# Patient Record
Sex: MALE | Race: WHITE | URBAN - METROPOLITAN AREA
[De-identification: names, ages, dates, MRNs, and addresses within clinical notes are randomized per-mention and may not be internally consistent; named-entity substitution may affect disease eponyms.]

---

## 2018-08-20 ENCOUNTER — APPOINTMENT (OUTPATIENT)
Dept: OCCUPATIONAL MEDICINE | Facility: CLINIC | Age: 33
End: 2018-08-20

## 2018-08-20 PROCEDURE — 99000 SPECIMEN HANDLING OFFICE-LAB: CPT | Performed by: FAMILY MEDICINE

## 2018-11-02 ENCOUNTER — ALLIED HEALTH/NURSE VISIT (OUTPATIENT)
Dept: FAMILY MEDICINE | Facility: CLINIC | Age: 33
End: 2018-11-02

## 2018-11-02 DIAGNOSIS — T30.4 CHEMICAL BURN: Primary | ICD-10-CM

## 2018-11-02 PROCEDURE — 99207 ZZC NO CHARGE NURSE ONLY: CPT
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
## 2018-12-10 ENCOUNTER — TELEPHONE (OUTPATIENT)
Dept: FAMILY MEDICINE | Facility: CLINIC | Age: 33
End: 2018-12-10

## 2018-12-10 ENCOUNTER — OFFICE VISIT (OUTPATIENT)
Dept: FAMILY MEDICINE | Facility: CLINIC | Age: 33
End: 2018-12-10
Payer: OTHER MISCELLANEOUS

## 2018-12-10 ENCOUNTER — ANCILLARY PROCEDURE (OUTPATIENT)
Dept: GENERAL RADIOLOGY | Facility: CLINIC | Age: 33
End: 2018-12-10
Attending: NURSE PRACTITIONER
Payer: OTHER MISCELLANEOUS

## 2018-12-10 VITALS
TEMPERATURE: 98.9 F | WEIGHT: 200 LBS | HEART RATE: 76 BPM | DIASTOLIC BLOOD PRESSURE: 70 MMHG | SYSTOLIC BLOOD PRESSURE: 126 MMHG | RESPIRATION RATE: 18 BRPM

## 2018-12-10 DIAGNOSIS — S96.911A STRAIN OF RIGHT ANKLE, INITIAL ENCOUNTER: Primary | ICD-10-CM

## 2018-12-10 DIAGNOSIS — S86.001A INJURY OF RIGHT ACHILLES TENDON, INITIAL ENCOUNTER: ICD-10-CM

## 2018-12-10 DIAGNOSIS — S96.911A STRAIN OF RIGHT ANKLE, INITIAL ENCOUNTER: ICD-10-CM

## 2018-12-10 PROCEDURE — 73610 X-RAY EXAM OF ANKLE: CPT | Mod: RT

## 2018-12-10 PROCEDURE — 99214 OFFICE O/P EST MOD 30 MIN: CPT | Performed by: NURSE PRACTITIONER

## 2018-12-10 SDOH — HEALTH STABILITY: MENTAL HEALTH: HOW OFTEN DO YOU HAVE A DRINK CONTAINING ALCOHOL?: NEVER

## 2018-12-10 NOTE — PATIENT INSTRUCTIONS
Patient is to contact [facility]  at [phone] to schedule the MRI.        Patient Education      *SPRAIN:ANKLE [w/ x-ray]    A sprain is an injury to the ligaments or capsule that holds a joint together. There are no broken bones. Most sprains take from four to six weeks to heal. If the ligament is completely torn (severe sprain), it can take several months to recover.  Mild to Moderate sprains may be treated with an elastic wrap or an in-shoe splint to provide support and prevent re-injury. A mild sprain may not require any additional support. A severe sprain may require surgery to repair, but this is rare.  HOME CARE:  1. Stay off the injured leg as much as possible until you can walk on it without pain. If you have a lot of pain with walking, crutches or a walker may be prescribed. (These can be rented or purchased at many pharmacies and surgical or orthopedic supply stores). Follow your doctor's advice regarding when to begin bearing weight on that leg.  2. Keep your leg elevated to reduce pain and swelling. When sleeping, place a pillow under the injured leg. When sitting, support the injured leg so it is level with your waist. This is very important during the first 48 hours.  3. Apply an ice pack (ice cubes in a plastic bag, wrapped in a towel) over the injured area for 20 minutes every 1-2 hours the first day. You can place the ice pack directly over the splint/cast. If you were given a boot, open it to apply the ice pack. Continue with ice packs 3-4 times a day for the next two days, then as needed for the relief of pain and swelling.  4. You may use acetaminophen (Tylenol) 650-1000 mg every 6 hours or ibuprofen (Motrin, Advil) 600 mg every 6-8 hours with food to control pain, if you are able to take these medicines. [NOTE: If you have chronic liver or kidney disease or ever had a stomach ulcer or GI bleeding, talk with your doctor before using these medicines.]  5. You may  return to sports after healing, when you can run without pain.  6. A sprained ankle is at risk for re-injury during the first six weeks. During that time, protect your ankle with an in-shoe splint that prevents tilting of your ankle from side to side. This is very important if you do active work or play sports during that time.  FOLLOW UP with your doctor, or as advised, if you are not starting to improve within the next five days.     GET PROMPT MEDICAL ATTENTION if any of the following occur:    The plaster cast or splint gets wet or soft    The fiberglass cast or splint gets wet and does not dry for 24 hours    Pain or swelling increases, or redness appears    Toes become cold, blue, numb or tingly    0912-1811 Located within [facility], [address] [place] [address], [place] [address]. All rights reserved. This information is not intended as a substitute for professional medical care. Always follow your healthcare professional's instructions.

## 2018-12-10 NOTE — NURSING NOTE
Chief Complaint   Patient presents with     Ankle Pain       Initial /70 (BP Location: Right arm, Cuff Size: Adult Large)   Pulse 76   Temp 98.9  F (37.2  C) (Tympanic)   Resp 18   Wt 90.7 kg (200 lb)  There is no height or weight on file to calculate BMI.    Patient presents to the clinic using No DME    Health Maintenance that is potentially due pending provider review:  NONE    Is there anyone who you would like to be able to receive your results? No  If yes have patient fill out DARREL

## 2018-12-10 NOTE — TELEPHONE ENCOUNTER
Pt was into today with KACY Dillard.  Pt was given a letter. Would like it revised to say.  Pt works 2 hrs one way 4 hrs a day.  Provider said he cannot drive that far and keep his Rt Ankle elevated.  Pt letter should say he cannot drive due to how many hrs he is on the road.  [employer] support works.  [email or web address]  [facility]

## 2018-12-10 NOTE — PROGRESS NOTES
SUBJECTIVE:   Pravin Landaverde is a 33 year old male who presents to clinic today for the following health issues:    Joint Pain    Onset: about one week    Description:   Location: right ankle  Character: Dull ache    Intensity: moderate    Progression of Symptoms: slightly improving    Accompanying Signs & Symptoms:  Other symptoms: swelling, bruising, pain radiating from right hip down leg    History:   Previous similar pain: no       Precipitating factors:   Trauma or overuse: YES- injured his ankle stepping out of a truck at work    Alleviating factors:  Improved by: nothing    Therapies Tried and outcome: ice, elevation, ibuprofen    Problem list and histories reviewed & adjusted, as indicated.  Additional history: as documented    There is no problem list on file for this patient.    History reviewed. No pertinent surgical history.    Social History     Tobacco Use     Smoking status: Never Smoker     Smokeless tobacco: Never Used   Substance Use Topics     Alcohol use: No     Frequency: Never     History reviewed. No pertinent family history.      No current outpatient medications on file.     No Known Allergies  No lab results found.   BP Readings from Last 3 Encounters:   12/10/18 126/70    Wt Readings from Last 3 Encounters:   12/10/18 90.7 kg (200 lb)                    Reviewed and updated as needed this visit by clinical staff       Reviewed and updated as needed this visit by Provider         ROS:  Constitutional, HEENT, cardiovascular, pulmonary, gi and gu systems are negative, except as otherwise noted.    OBJECTIVE:     /70 (BP Location: Right arm, Cuff Size: Adult Large)   Pulse 76   Temp 98.9  F (37.2  C) (Tympanic)   Resp 18   Wt 90.7 kg (200 lb)   There is no height or weight on file to calculate BMI.  GENERAL: healthy, alert and no distress  EYES: Eyes grossly normal to inspection, PERRL and conjunctivae and sclerae normal  HENT: ear canals and TM's normal, nose and mouth without  ulcers or lesions  NECK: no adenopathy, no asymmetry, masses, or scars and thyroid normal to palpation  RESP: lungs clear to auscultation - no rales, rhonchi or wheezes  CV: regular rate and rhythm, normal S1 S2, no S3 or S4, no murmur, click or rub, no peripheral edema and peripheral pulses strong  MS: no gross musculoskeletal defects noted, no edema  MS:   Knee:normal appearance, normal on palpation  Lower leg:normal appearance, normal on palpation    ANKLE  Inspection:Swelling and contusion; medial and extends up the calf   Tender:medial malleolus, deltoid ligament, anterior tib-fib ligament, achilles tendon  Non-tender:lateral malleolus, distal 3rd fibula shaft, mid-fibula shaft, proximal fibula, distal tibia, 5th metatarsal base  Range of Motion:dorsiflexion:  unable, plantarflexion:  unable, inversion:  painful, eversion:  painful  Strength:dorsiflexion:  4-/5, plantarflexion:  4-/5, inversion: 4-/5, eversion:4-/5, core strength    Special tests:positive anterior drawer, positive talar tilt  Stance: pes cavus without weightbearing    FOOT  foot exam : Inspection Palpation:   Swelling: medial swelling  Tender:: at lateral malleolus  Non-tender:calcaneous , cuboid, navicular, cuneiform lateral, cuneiform middle, cuneiform medial , metatarsal heads, plantar fascia  Range of Motion:flexion of toes:  full, extension of toes  full      SKIN: no suspicious lesions or rashes  NEURO: Normal strength and tone, mentation intact and speech normal  PSYCH: mentation appears normal, affect normal/bright    XR ANKLE RT G/E 3 VW 12/10/2018 8:30 AM     HISTORY: Injury one week ago.     COMPARISON: None.     FINDINGS: No fracture or malalignment. Mortise joint is congruent.  Osseous structures appear normal.                                                                      IMPRESSION: No evidence of osseous trauma.       ASSESSMENT/PLAN:   (I23.404N) Strain of right ankle, initial encounter  (primary encounter  diagnosis)  Comment: Patient sprained his ankle a week ago initial x-ray was negative and [place].  Patient was stepping out of the truck tripped and landed wrong on his foot.  Patient is unable to bear any weight on his ankle contusion seen from the medial aspect extending up to the calf.  Limited range of motion unable to flex foot.  X-ray today was negative for any fractures.  Concern for ankle sprain with involvement of the Achilles tendon based on pain and not improving will obtain an MRI.  Patient has a cam boot but unable to wear it.  We will put him in an ankle splint Tri-Lock did recommend use of the cam boot as much as possible to have a Tri-Lock to help with unable to wear the cam boot.  Patient will be on crutches work restrictions provided patient will have follow-up in 2 weeks based on what we find in the MRI.  Patient states pain is is managed ibuprofen does not want pain medications at this time.  Plan: XR Ankle Right G/E 3 Views, order for DME, MR         Ankle Right w/o Contrast      (S86.001A) Injury of right Achilles tendon, initial encounter  Comment:   Plan: MRI      NAFISA Vazquez CNP  [facility] [place]

## 2018-12-10 NOTE — LETTER
[facility]  [address] [place] [id number]  Phone: [phone]  Fax: [phone]    December 10, 2018        Pravin Landaverde  [address]  [place] [address]          To whom it may concern:    RE: Pravin Landaverde        To whom it may concern:    RE: Pravin Landaverde    Patient was seen and treated today at our clinic and missed work.  Patient may return to work  with the following:  Light duty-unable to lift more than 10 lbs, unable to bear weight on his right foot.  Unable to drive for greater than 1 hour at a time.     Restrictions apply for 2 weeks then patient will need to be re-evaluated for further work restrictions.     Please contact me for questions or concerns.      Sincerely,        NAFISA Vazquez CNP

## 2018-12-10 NOTE — LETTER
[facility]  [address] [place] [id number]  Phone: [phone]  Fax: [phone]    December 10, 2018        Pravin Landaverde  [address]  [place] [address]          To whom it may concern:    RE: Pravin Landaverde    Patient was seen and treated today at our clinic and missed work.  Patient may return to work  with the following:  Light duty-unable to lift more than 10 lbs, unable to bear weight on his right foot for 2 weeks.  Will need to be re-evaluated in 2 weeks for further work restrictions.     Please contact me for questions or concerns.      Sincerely,        NAFISA Vazquez CNP

## 2019-05-21 ENCOUNTER — ALLIED HEALTH/NURSE VISIT (OUTPATIENT)
Dept: FAMILY MEDICINE | Facility: CLINIC | Age: 34
End: 2019-05-21

## 2019-05-21 VITALS — BODY MASS INDEX: 30.07 KG/M2 | HEIGHT: 69 IN | WEIGHT: 203 LBS

## 2019-05-21 PROCEDURE — 99207 ZZC NO CHARGE NURSE ONLY: CPT | Performed by: NURSE PRACTITIONER

## 2019-05-21 ASSESSMENT — MIFFLIN-ST. JEOR: SCORE: 1861.18

## 2021-06-01 ENCOUNTER — RECORDS - HEALTHEAST (OUTPATIENT)
Dept: ADMINISTRATIVE | Facility: CLINIC | Age: 36
End: 2021-06-01